# Patient Record
Sex: FEMALE | Race: WHITE | NOT HISPANIC OR LATINO
[De-identification: names, ages, dates, MRNs, and addresses within clinical notes are randomized per-mention and may not be internally consistent; named-entity substitution may affect disease eponyms.]

---

## 2017-02-23 ENCOUNTER — TRANSCRIPTION ENCOUNTER (OUTPATIENT)
Age: 67
End: 2017-02-23

## 2017-10-14 ENCOUNTER — EMERGENCY (EMERGENCY)
Facility: HOSPITAL | Age: 67
LOS: 0 days | Discharge: HOME | End: 2017-10-14
Attending: EMERGENCY MEDICINE | Admitting: EMERGENCY MEDICINE
Payer: MEDICARE

## 2017-10-14 VITALS
RESPIRATION RATE: 20 BRPM | OXYGEN SATURATION: 100 % | TEMPERATURE: 98 F | DIASTOLIC BLOOD PRESSURE: 80 MMHG | HEART RATE: 67 BPM | SYSTOLIC BLOOD PRESSURE: 170 MMHG

## 2017-10-14 VITALS — HEIGHT: 63 IN | WEIGHT: 138.01 LBS

## 2017-10-14 DIAGNOSIS — S29.8XXA OTHER SPECIFIED INJURIES OF THORAX, INITIAL ENCOUNTER: ICD-10-CM
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
## 2017-10-14 DIAGNOSIS — S22.32XA FRACTURE OF ONE RIB, LEFT SIDE, INITIAL ENCOUNTER FOR CLOSED FRACTURE: ICD-10-CM
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
## 2017-10-14 PROCEDURE — 99283 EMERGENCY DEPT VISIT LOW MDM: CPT

## 2017-10-14 PROCEDURE — 71100 X-RAY EXAM RIBS UNI 2 VIEWS: CPT | Mod: 26,LT

## 2017-10-14 PROCEDURE — 71020: CPT | Mod: 26

## 2017-10-14 NOTE — ED STATDOCS - OBJECTIVE STATEMENT
68 y/o F with no PMHx presents to the ED s/p fall at 1600 this afternoon. Pt slipped over split sour cream while at the grocery store and her left side stuck against the cart. Pain is not exacerbated with deep breaths. Pain is exacerbated with movement of left arm. Non smoker.

## 2017-10-14 NOTE — ED STATDOCS - PROGRESS NOTE DETAILS
patient refused pain meds. Patient is feeling much better, tests/labs reviewed. case discussed with attending. incentive spirometer given. OK to dc home. EDELMIRA Salguero

## 2017-10-14 NOTE — ED STATDOCS - PHYSICAL EXAMINATION
GEN: AOX3, NAD. HEENT: Throat clear. Head NC/AT. NECK: Supple, No JVD. FROM. C-spine non-tender. CHEST: +Mild tenderness left anterolateral chest wall. No obvious deformity. Equal chest expansion and rise. CV:S1S2, RRR, LUNGS: CTA/b/l, no w/r/r. ABD: Soft, NT/ND, no rebound, no guarding. No CVAT. EXT: No e/c/c. 2+ distal pulses. SKIN: No rashes. NEURO: No focal deficits. CN II-XII intact. FROM. 5/5 motor and sensory. EDELMIRA Salguero

## 2017-10-14 NOTE — ED ADULT NURSE REASSESSMENT NOTE - NS ED NURSE REASSESS COMMENT FT1
Reviewed instruction on how to properly use incentive spirometer, teach back method employed, and pt able to return demo use. All questions answered, awaiting dispo.

## 2017-10-17 ENCOUNTER — EMERGENCY (EMERGENCY)
Facility: HOSPITAL | Age: 67
LOS: 0 days | Discharge: HOME | End: 2017-10-17
Attending: EMERGENCY MEDICINE | Admitting: EMERGENCY MEDICINE
Payer: MEDICARE

## 2017-10-17 VITALS — DIASTOLIC BLOOD PRESSURE: 69 MMHG | SYSTOLIC BLOOD PRESSURE: 155 MMHG

## 2017-10-17 VITALS — HEIGHT: 63 IN | WEIGHT: 138.01 LBS
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
## 2017-10-17 DIAGNOSIS — S64.12XA INJURY OF MEDIAN NERVE AT WRIST AND HAND LEVEL OF LEFT ARM, INITIAL ENCOUNTER: ICD-10-CM

## 2017-10-17 DIAGNOSIS — M54.2 CERVICALGIA: ICD-10-CM

## 2017-10-17 PROCEDURE — 73030 X-RAY EXAM OF SHOULDER: CPT | Mod: 26,LT

## 2017-10-17 PROCEDURE — 99284 EMERGENCY DEPT VISIT MOD MDM: CPT

## 2017-10-17 PROCEDURE — 73080 X-RAY EXAM OF ELBOW: CPT | Mod: 26,LT

## 2017-10-17 RX ORDER — IBUPROFEN 200 MG
600 TABLET ORAL ONCE
Qty: 0 | Refills: 0 | Status: COMPLETED | OUTPATIENT
Start: 2017-10-17 | End: 2017-10-17

## 2017-10-17 RX ORDER — IBUPROFEN 200 MG
1 TABLET ORAL
Qty: 20 | Refills: 0 | OUTPATIENT
Start: 2017-10-17 | End: 2017-10-22

## 2017-10-17 RX ADMIN — Medication 600 MILLIGRAM(S): at 11:11

## 2017-10-17 NOTE — ED STATDOCS - ATTENDING CONTRIBUTION TO CARE
I, Saeed Alfaro, performed the initial face to face bedside interview with this patient regarding history of present illness, review of symptoms and relevant past medical, social and family history.  I completed an independent physical examination.  I was the initial provider who evaluated this patient. I have signed out the follow up of any pending tests (i.e. labs, radiological studies) to the ACP.  I have communicated the patient’s plan of care and disposition with the ACP.  The history, relevant review of systems, past medical and surgical history, medical decision making, and physical examination was documented by the scribe in my presence and I attest to the accuracy of the documentation.

## 2017-10-17 NOTE — ED ADULT NURSE NOTE - OBJECTIVE STATEMENT
66 y/o F c/o neck pain s/p a fall several days ago in grocery store. Pt found to have a rib fx and today c/o neck pain and arm soreness.

## 2017-10-17 NOTE — ED STATDOCS - MUSCULOSKELETAL, MLM
range of motion is not limited. No c-spine tenderness. tenderness lateral aspect of left elbow and wrist, FROM, 2+ pulses. No swelling/deformiting, FROM of wrist, no bony tenderness. Neurovascularly intact,.

## 2017-10-17 NOTE — ED STATDOCS - PROGRESS NOTE DETAILS
EDELMIRA Jones:   Patient has been seen, evaluated and orders have been written by the attending in intake. Patient is stable.  I will follow up the results of orders written and I will continue to evaluate/observe the patient.  Plan for xray/pain management  Eileen Jones PA-C

## 2017-10-17 NOTE — ED STATDOCS - OBJECTIVE STATEMENT
66 y/o F w/ no pmhx presents to ED c/o left neck and arm pain s/p slip and fall 3 nights ago in the grocery store. Pt went to doctor and was found to have broken rib. Now presents with left sided neck and arm soreness. Pt states pain feels like a dull ache, also c/o left shoulder, elbow and wrist pain, states it is difficult to hold her cell phone with her left hand, able to lift arm above head with some pain. Pt took ASA, has not taken an NSAIDs. Denies abd pain, LOC, or any other acute complaints. Allergic to penicillin.

## 2018-01-25 ENCOUNTER — TRANSCRIPTION ENCOUNTER (OUTPATIENT)
Age: 68
End: 2018-01-25

## 2018-01-26 ENCOUNTER — TRANSCRIPTION ENCOUNTER (OUTPATIENT)
Age: 68
End: 2018-01-26

## 2018-02-08 ENCOUNTER — TRANSCRIPTION ENCOUNTER (OUTPATIENT)
Age: 68
End: 2018-02-08

## 2018-02-20 ENCOUNTER — TRANSCRIPTION ENCOUNTER (OUTPATIENT)
Age: 68
End: 2018-02-20

## 2018-03-19 ENCOUNTER — EMERGENCY (EMERGENCY)
Facility: HOSPITAL | Age: 68
LOS: 0 days | Discharge: HOME | End: 2018-03-19
Attending: EMERGENCY MEDICINE | Admitting: EMERGENCY MEDICINE
Payer: MEDICARE

## 2018-03-19 VITALS — DIASTOLIC BLOOD PRESSURE: 78 MMHG | HEART RATE: 60 BPM | SYSTOLIC BLOOD PRESSURE: 180 MMHG

## 2018-03-19 VITALS — WEIGHT: 136.03 LBS

## 2018-03-19 DIAGNOSIS — Z91.81 HISTORY OF FALLING: ICD-10-CM

## 2018-03-19 DIAGNOSIS — M54.5 LOW BACK PAIN: ICD-10-CM

## 2018-03-19 PROCEDURE — 99283 EMERGENCY DEPT VISIT LOW MDM: CPT

## 2018-03-19 RX ORDER — DIAZEPAM 5 MG
5 TABLET ORAL ONCE
Qty: 0 | Refills: 0 | Status: DISCONTINUED | OUTPATIENT
Start: 2018-03-19 | End: 2018-03-19

## 2018-03-19 RX ORDER — KETOROLAC TROMETHAMINE 30 MG/ML
30 SYRINGE (ML) INJECTION ONCE
Qty: 0 | Refills: 0 | Status: DISCONTINUED | OUTPATIENT
Start: 2018-03-19 | End: 2018-03-19

## 2018-03-19 RX ORDER — DIAZEPAM 5 MG
1 TABLET ORAL
Qty: 21 | Refills: 0 | OUTPATIENT
Start: 2018-03-19 | End: 2018-03-25

## 2018-03-19 RX ADMIN — Medication 5 MILLIGRAM(S): at 08:32

## 2018-03-19 RX ADMIN — Medication 30 MILLIGRAM(S): at 08:32

## 2018-03-19 NOTE — ED ADULT NURSE NOTE - PAIN RATING/NUMBER SCALE (0-10): ACTIVITY
Spoke w/ Stefanie Meehan LPN regarding pt's spouse calling regarding no pain relief. Pt was given a Medrol dosepack on 09/25/17. Pt and spouse advised that it has not been enough time to work. Pt is a pt at [facility] and gets narcotics there. She is reporting BLE neuropathic pain, I Rx Gabapentin. 10

## 2018-03-19 NOTE — ED ADULT TRIAGE NOTE - CHIEF COMPLAINT QUOTE
Pt presents to ED c/o lower back pain s/p fall. Pt reports she was exercising with TRX bands when she fell backwards. Pt denies hitting head, blood thinner and LOC. Pt GCS 15 in triage. Pt denies bladder/bowel dysfunction. Pt reports she is currently on abx for treatement of diverticulitis.

## 2018-03-19 NOTE — ED PROVIDER NOTE - OBJECTIVE STATEMENT
68 yo F hx of recent diverticulitis, presents with CC back pain.  Pt exercising this morning, was using exercise bands, and they broke.  She fell onto her butt/back.  C/o low back pain.  Denies incontinence, urinary retention, saddle anesthesia, focal deficit or any other symptoms.  No meds taken prior to arrival.  No other concerns.

## 2018-03-19 NOTE — ED PROVIDER NOTE - MEDICAL DECISION MAKING DETAILS
No red flags for back pain, including no fever, no hx of IVDU, no hx of CA, no trauma, no recent surgery to spine, no urinary retention, no bowel or bladder incontinence, no saddle anesthesia, no sensory or motor neurologic deficit.  Thus no concern for epidural abscess, epidural hematoma, cauda equina, vertebral fracture, or any other emergent cause for back pain.  Diagnosis of back pain, likely musculoskeletal in nature.  Recommend rest, NSAIDs, muscle relaxants, no heavy lifting.  Follow up with PCP in 1 week.  Return precautions given.

## 2018-03-19 NOTE — ED ADULT NURSE NOTE - OBJECTIVE STATEMENT
Pt reports pulling backward on exercise band and band snapping, causing her to fall and injure back this morning.  Pt complains of severe low back pain without radiation.

## 2019-03-19 ENCOUNTER — TRANSCRIPTION ENCOUNTER (OUTPATIENT)
Age: 69
End: 2019-03-19

## 2019-08-27 ENCOUNTER — TRANSCRIPTION ENCOUNTER (OUTPATIENT)
Age: 69
End: 2019-08-27

## 2019-09-01 ENCOUNTER — TRANSCRIPTION ENCOUNTER (OUTPATIENT)
Age: 69
End: 2019-09-01

## 2020-08-19 NOTE — ED STATDOCS - FALL HARM RISK TYPE OF ASSESSMENT
Medication:   Requested Prescriptions     Pending Prescriptions Disp Refills    HYDROcodone-acetaminophen (NORCO) 7.5-325 MG per tablet 120 tablet 0     Sig: Take 1 tablet by mouth every 6 hours as needed for Pain for up to 30 days. Last Filled:  7/22/2020 #120 0rf    Patient Phone Number: [phone] (home)     Last appt: 7/17/2020   Next appt: 8/28/2020    Last OARRS:   RX Monitoring 7/22/2020   Attestation -   Acute Pain Prescriptions -   Periodic Controlled Substance Monitoring No signs of potential drug abuse or diversion identified.    Chronic Pain > 80 MEDD - Daily Assessment

## 2020-10-05 ENCOUNTER — APPOINTMENT (OUTPATIENT)
Dept: DERMATOLOGY | Facility: CLINIC | Age: 70
End: 2020-10-05
Payer: MEDICARE

## 2020-10-05 VITALS — HEIGHT: 63 IN | BODY MASS INDEX: 22.86 KG/M2 | WEIGHT: 129 LBS

## 2020-10-05 DIAGNOSIS — Z87.2 PERSONAL HISTORY OF DISEASES OF THE SKIN AND SUBCUTANEOUS TISSUE: ICD-10-CM

## 2020-10-05 DIAGNOSIS — D18.00 HEMANGIOMA UNSPECIFIED SITE: ICD-10-CM

## 2020-10-05 DIAGNOSIS — Z78.9 OTHER SPECIFIED HEALTH STATUS: ICD-10-CM

## 2020-10-05 DIAGNOSIS — L81.4 OTHER MELANIN HYPERPIGMENTATION: ICD-10-CM

## 2020-10-05 PROCEDURE — 99203 OFFICE O/P NEW LOW 30 MIN: CPT

## 2020-10-05 RX ORDER — CHROMIUM 200 MCG
TABLET ORAL
Refills: 0 | Status: ACTIVE | COMMUNITY

## 2020-10-05 NOTE — PHYSICAL EXAM
[Alert] : alert [Oriented x 3] : ~L oriented x 3 [Well Nourished] : well nourished [Full Body Skin Exam Performed] : performed [FreeTextEntry3] : A full skin exam was performed including the scalp, face, neck, chest, abdomen, back, buttocks, upper extremities and lower extremities.  The patient declined examination of the breasts and genitalia.  \par The exam did show the following benign growths:\par Lentigines.\par Red patch with red-blue papules, 2 small nodules - sternum.\par \par

## 2020-10-05 NOTE — HISTORY OF PRESENT ILLNESS
[FreeTextEntry1] : Patient presents for skin examination. [de-identified] : Notes birthmark of the chest.  Some bumps occurring intermittently within the birthmark.  No bleeding, no tenderness.  No self tx.

## 2020-10-05 NOTE — ASSESSMENT
[FreeTextEntry1] : A complete skin examination was performed.  There is no evidence of skin cancer.  We discussed the importance of photoprotection, including the use of hats, protective clothing and sunscreens with an SPF of at least 30.  Sun avoidance was also discussed.  The ABCDE's of melanoma was discussed.  Regular skin exams recommended.\par \par Hemangioma\par Education.\par Discussed PDL - if desires.\par No evidence of neoplastic changes - discussed with patient.

## 2021-01-02 ENCOUNTER — TRANSCRIPTION ENCOUNTER (OUTPATIENT)
Age: 71
End: 2021-01-02

## 2021-06-23 ENCOUNTER — TRANSCRIPTION ENCOUNTER (OUTPATIENT)
Age: 71
End: 2021-06-23

## 2021-10-22 ENCOUNTER — TRANSCRIPTION ENCOUNTER (OUTPATIENT)
Age: 71
End: 2021-10-22

## 2023-06-05 DIAGNOSIS — Z82.49 FAMILY HISTORY OF ISCHEMIC HEART DISEASE AND OTHER DISEASES OF THE CIRCULATORY SYSTEM: ICD-10-CM

## 2023-06-05 DIAGNOSIS — Z78.9 OTHER SPECIFIED HEALTH STATUS: ICD-10-CM

## 2023-06-05 DIAGNOSIS — Z83.3 FAMILY HISTORY OF DIABETES MELLITUS: ICD-10-CM

## 2023-06-05 DIAGNOSIS — Z82.3 FAMILY HISTORY OF STROKE: ICD-10-CM

## 2023-06-06 ENCOUNTER — APPOINTMENT (OUTPATIENT)
Dept: CARDIOLOGY | Facility: CLINIC | Age: 73
End: 2023-06-06
Payer: MEDICARE

## 2023-06-06 ENCOUNTER — NON-APPOINTMENT (OUTPATIENT)
Age: 73
End: 2023-06-06

## 2023-06-06 VITALS
BODY MASS INDEX: 23.04 KG/M2 | OXYGEN SATURATION: 98 % | HEART RATE: 66 BPM | WEIGHT: 130 LBS | DIASTOLIC BLOOD PRESSURE: 82 MMHG | SYSTOLIC BLOOD PRESSURE: 162 MMHG | HEIGHT: 63 IN

## 2023-06-06 DIAGNOSIS — R94.31 ABNORMAL ELECTROCARDIOGRAM [ECG] [EKG]: ICD-10-CM

## 2023-06-06 PROCEDURE — 99204 OFFICE O/P NEW MOD 45 MIN: CPT

## 2023-06-06 PROCEDURE — 93000 ELECTROCARDIOGRAM COMPLETE: CPT

## 2023-06-06 RX ORDER — AMLODIPINE BESYLATE 10 MG/1
10 TABLET ORAL DAILY
Qty: 90 | Refills: 3 | Status: ACTIVE | COMMUNITY
Start: 1900-01-01 | End: 1900-01-01

## 2023-06-06 NOTE — HISTORY OF PRESENT ILLNESS
[FreeTextEntry1] : 72 year old woman with a history of HTN presents for an initial cardiac evaluation. \par \par She is pending a cataract surgery.\par She   denies any chest pain, PND, orthopnea, lower extremity edema, near syncope, syncope, strokelike symptoms. 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

## 2023-06-06 NOTE — CARDIOLOGY SUMMARY
[de-identified] : Sinus  Rhythm \par -Left atrial enlargement. \par  -Anteroseptal infarct -age undetermined. \par

## 2023-06-06 NOTE — DISCUSSION/SUMMARY
[FreeTextEntry1] : 72 year woman with a history as listed presents for an initial cardiac evaluation. \par Melissa is doing well.  She has nonspecific EKG changes. She will undergo a treadmill exercise stress test to define exercise tolerance, rule out exertional hypertensive responses, assess for exercise induced arrhythmias and rule out ischemia from obstructive CAD. She will get a 2d echo to assess for any  new structural heart disease, changes in valvular and ventricular function. \par her blood pressure is uncontrolled. She will increase her Norvasc 10mg Qday. She will try to maintain a BP log at home. Reducing dietary salt intake advised. \par She will have her baseline lab work, including lipids, done prior to the next visit. \par Exercise and diet counseling was performed in order to reduce her future cardiovascular risk. \par She will followup with me in 3-4 months or sooner if necessary.  [EKG obtained to assist in diagnosis and management of assessed problem(s)] : EKG obtained to assist in diagnosis and management of assessed problem(s)

## 2023-06-19 LAB
ALBUMIN SERPL ELPH-MCNC: 4.8 G/DL
ALP BLD-CCNC: 77 U/L
ALT SERPL-CCNC: 24 U/L
ANION GAP SERPL CALC-SCNC: 12 MMOL/L
AST SERPL-CCNC: 26 U/L
BILIRUB SERPL-MCNC: 0.5 MG/DL
BUN SERPL-MCNC: 20 MG/DL
CALCIUM SERPL-MCNC: 10 MG/DL
CHLORIDE SERPL-SCNC: 103 MMOL/L
CHOLEST SERPL-MCNC: 233 MG/DL
CO2 SERPL-SCNC: 28 MMOL/L
CREAT SERPL-MCNC: 0.68 MG/DL
EGFR: 92 ML/MIN/1.73M2
ESTIMATED AVERAGE GLUCOSE: 114 MG/DL
GLUCOSE SERPL-MCNC: 95 MG/DL
HBA1C MFR BLD HPLC: 5.6 %
HDLC SERPL-MCNC: 77 MG/DL
LDLC SERPL CALC-MCNC: 133 MG/DL
MAGNESIUM SERPL-MCNC: 2.2 MG/DL
NONHDLC SERPL-MCNC: 156 MG/DL
POTASSIUM SERPL-SCNC: 5.1 MMOL/L
PROT SERPL-MCNC: 6.9 G/DL
SODIUM SERPL-SCNC: 143 MMOL/L
TRIGL SERPL-MCNC: 115 MG/DL
TSH SERPL-ACNC: 1.6 UIU/ML

## 2023-06-22 ENCOUNTER — APPOINTMENT (OUTPATIENT)
Dept: CARDIOLOGY | Facility: CLINIC | Age: 73
End: 2023-06-22
Payer: MEDICARE

## 2023-06-22 PROCEDURE — 93015 CV STRESS TEST SUPVJ I&R: CPT

## 2023-06-22 PROCEDURE — 93306 TTE W/DOPPLER COMPLETE: CPT

## 2023-09-07 ENCOUNTER — APPOINTMENT (OUTPATIENT)
Dept: CARDIOLOGY | Facility: CLINIC | Age: 73
End: 2023-09-07
Payer: MEDICARE

## 2023-09-07 VITALS
BODY MASS INDEX: 23.04 KG/M2 | HEART RATE: 61 BPM | OXYGEN SATURATION: 99 % | DIASTOLIC BLOOD PRESSURE: 78 MMHG | WEIGHT: 130 LBS | SYSTOLIC BLOOD PRESSURE: 127 MMHG | HEIGHT: 63 IN

## 2023-09-07 PROCEDURE — 99214 OFFICE O/P EST MOD 30 MIN: CPT

## 2023-09-07 PROCEDURE — 93000 ELECTROCARDIOGRAM COMPLETE: CPT

## 2023-09-07 NOTE — DISCUSSION/SUMMARY
[FreeTextEntry1] : 73 year woman with a history as listed presents for an initial cardiac evaluation.  Melissa is doing well.  She denies any anginal symptoms. Clinically she is euvolemic on exam.  She has nonspecific EKG changes.   her blood pressure is controlled. She will continue Norvasc 10mg Qday. She will try to maintain a BP log at home. Reducing dietary salt intake advised.   She has mildly elevated lipids. Will change her diet. Repeat in 6 months.  Exercise and diet counseling was performed in order to reduce her future cardiovascular risk.  She will followup with me in 6 months or sooner if necessary.  [EKG obtained to assist in diagnosis and management of assessed problem(s)] : EKG obtained to assist in diagnosis and management of assessed problem(s)

## 2023-09-07 NOTE — HISTORY OF PRESENT ILLNESS
[FreeTextEntry1] : 73 year old woman with a history of HTN presents for a follow-up cardiac evaluation.   Since her last visit, she is feeling well. She   denies any chest pain, PND, orthopnea, lower extremity edema, near syncope, syncope, strokelike symptoms. 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

## 2023-11-07 ENCOUNTER — APPOINTMENT (OUTPATIENT)
Dept: FAMILY MEDICINE | Facility: CLINIC | Age: 73
End: 2023-11-07
Payer: MEDICARE

## 2023-11-07 VITALS
BODY MASS INDEX: 22.32 KG/M2 | OXYGEN SATURATION: 99 % | WEIGHT: 126 LBS | DIASTOLIC BLOOD PRESSURE: 84 MMHG | SYSTOLIC BLOOD PRESSURE: 149 MMHG | HEIGHT: 63 IN | HEART RATE: 62 BPM

## 2023-11-07 VITALS — DIASTOLIC BLOOD PRESSURE: 68 MMHG | SYSTOLIC BLOOD PRESSURE: 130 MMHG

## 2023-11-07 DIAGNOSIS — Z86.79 PERSONAL HISTORY OF OTHER DISEASES OF THE CIRCULATORY SYSTEM: ICD-10-CM

## 2023-11-07 DIAGNOSIS — Z00.00 ENCOUNTER FOR GENERAL ADULT MEDICAL EXAMINATION W/OUT ABNORMAL FINDINGS: ICD-10-CM

## 2023-11-07 DIAGNOSIS — L60.3 NAIL DYSTROPHY: ICD-10-CM

## 2023-11-07 PROCEDURE — G0439: CPT

## 2023-11-07 RX ORDER — CALCIUM CARBONATE/VITAMIN D3 600 MG-10
TABLET ORAL
Refills: 0 | Status: ACTIVE | COMMUNITY

## 2023-11-14 LAB
25(OH)D3 SERPL-MCNC: 39.8 NG/ML
ALBUMIN SERPL ELPH-MCNC: 4.7 G/DL
ALP BLD-CCNC: 78 U/L
ALT SERPL-CCNC: 17 U/L
ANION GAP SERPL CALC-SCNC: 11 MMOL/L
AST SERPL-CCNC: 18 U/L
BILIRUB SERPL-MCNC: 0.3 MG/DL
BUN SERPL-MCNC: 17 MG/DL
CALCIUM SERPL-MCNC: 9.5 MG/DL
CHLORIDE SERPL-SCNC: 102 MMOL/L
CHOLEST SERPL-MCNC: 206 MG/DL
CO2 SERPL-SCNC: 26 MMOL/L
CREAT SERPL-MCNC: 0.77 MG/DL
EGFR: 82 ML/MIN/1.73M2
FERRITIN SERPL-MCNC: 67 NG/ML
FOLATE SERPL-MCNC: 13 NG/ML
GLUCOSE SERPL-MCNC: 95 MG/DL
HDLC SERPL-MCNC: 69 MG/DL
IRON SATN MFR SERPL: 20 %
IRON SERPL-MCNC: 69 UG/DL
LDLC SERPL CALC-MCNC: 123 MG/DL
NONHDLC SERPL-MCNC: 137 MG/DL
POTASSIUM SERPL-SCNC: 5.1 MMOL/L
PROT SERPL-MCNC: 6.7 G/DL
SODIUM SERPL-SCNC: 139 MMOL/L
TIBC SERPL-MCNC: 353 UG/DL
TRANSFERRIN SERPL-MCNC: 307 MG/DL
TRIGL SERPL-MCNC: 79 MG/DL
UIBC SERPL-MCNC: 284 UG/DL
VIT B12 SERPL-MCNC: 714 PG/ML

## 2024-02-29 ENCOUNTER — LABORATORY RESULT (OUTPATIENT)
Age: 74
End: 2024-02-29

## 2024-02-29 LAB
ALBUMIN SERPL ELPH-MCNC: 4.3 G/DL
ALP BLD-CCNC: 74 U/L
ALT SERPL-CCNC: 17 U/L
ANION GAP SERPL CALC-SCNC: 12 MMOL/L
AST SERPL-CCNC: 16 U/L
BASOPHILS # BLD AUTO: 0.05 K/UL
BASOPHILS NFR BLD AUTO: 0.9 %
BILIRUB SERPL-MCNC: 0.5 MG/DL
BUN SERPL-MCNC: 23 MG/DL
CALCIUM SERPL-MCNC: 9.3 MG/DL
CHLORIDE SERPL-SCNC: 103 MMOL/L
CHOLEST SERPL-MCNC: 192 MG/DL
CO2 SERPL-SCNC: 24 MMOL/L
CREAT SERPL-MCNC: 0.7 MG/DL
EGFR: 92 ML/MIN/1.73M2
EOSINOPHIL # BLD AUTO: 0.14 K/UL
EOSINOPHIL NFR BLD AUTO: 2.5 %
ESTIMATED AVERAGE GLUCOSE: 111 MG/DL
GLUCOSE SERPL-MCNC: 80 MG/DL
HBA1C MFR BLD HPLC: 5.5 %
HCT VFR BLD CALC: 39.1 %
HDLC SERPL-MCNC: 67 MG/DL
HGB BLD-MCNC: 12.9 G/DL
IMM GRANULOCYTES NFR BLD AUTO: 0.4 %
LDLC SERPL CALC-MCNC: 104 MG/DL
LYMPHOCYTES # BLD AUTO: 1.52 K/UL
LYMPHOCYTES NFR BLD AUTO: 27.6 %
MAN DIFF?: NORMAL
MCHC RBC-ENTMCNC: 28.6 PG
MCHC RBC-ENTMCNC: 33 GM/DL
MCV RBC AUTO: 86.7 FL
MONOCYTES # BLD AUTO: 0.38 K/UL
MONOCYTES NFR BLD AUTO: 6.9 %
NEUTROPHILS # BLD AUTO: 3.4 K/UL
NEUTROPHILS NFR BLD AUTO: 61.7 %
NONHDLC SERPL-MCNC: 125 MG/DL
PLATELET # BLD AUTO: 275 K/UL
POTASSIUM SERPL-SCNC: 4.2 MMOL/L
PROT SERPL-MCNC: 6.2 G/DL
RBC # BLD: 4.51 M/UL
RBC # FLD: 12.8 %
SODIUM SERPL-SCNC: 139 MMOL/L
TRIGL SERPL-MCNC: 119 MG/DL
TSH SERPL-ACNC: 0.07 UIU/ML
WBC # FLD AUTO: 5.51 K/UL

## 2024-03-07 ENCOUNTER — APPOINTMENT (OUTPATIENT)
Dept: CARDIOLOGY | Facility: CLINIC | Age: 74
End: 2024-03-07
Payer: MEDICARE

## 2024-03-07 ENCOUNTER — NON-APPOINTMENT (OUTPATIENT)
Age: 74
End: 2024-03-07

## 2024-03-07 VITALS
DIASTOLIC BLOOD PRESSURE: 77 MMHG | HEIGHT: 63 IN | SYSTOLIC BLOOD PRESSURE: 138 MMHG | HEART RATE: 82 BPM | BODY MASS INDEX: 21.97 KG/M2 | OXYGEN SATURATION: 100 % | WEIGHT: 124 LBS

## 2024-03-07 VITALS — DIASTOLIC BLOOD PRESSURE: 70 MMHG | SYSTOLIC BLOOD PRESSURE: 128 MMHG

## 2024-03-07 DIAGNOSIS — E78.5 HYPERLIPIDEMIA, UNSPECIFIED: ICD-10-CM

## 2024-03-07 DIAGNOSIS — I10 ESSENTIAL (PRIMARY) HYPERTENSION: ICD-10-CM

## 2024-03-07 PROCEDURE — 93000 ELECTROCARDIOGRAM COMPLETE: CPT

## 2024-03-07 PROCEDURE — G2211 COMPLEX E/M VISIT ADD ON: CPT

## 2024-03-07 PROCEDURE — 99214 OFFICE O/P EST MOD 30 MIN: CPT

## 2024-03-07 NOTE — CARDIOLOGY SUMMARY
[de-identified] : Sinus Rhythm  -Left atrial enlargement. -Anteroseptal infarct -age undetermined.   [de-identified] : 7/2/23 8 METs no ischemic ekg changes.  [de-identified] : 6/22/23 normal LV function.

## 2024-03-07 NOTE — DISCUSSION/SUMMARY
[FreeTextEntry1] : 73 year woman with a history as listed presents for a followup cardiac evaluation.  Melissa is doing well.  She denies any anginal symptoms. Clinically she is euvolemic on exam.  She has nonspecific EKG changes.   her blood pressure is controlled. She will continue Norvasc 10mg Qday. She will try to maintain a BP log at home. Reducing dietary salt intake advised.  her lipids have improved overall. She would like to defer statin therapy.  Exercise and diet counseling was performed in order to reduce her future cardiovascular risk.  She will followup with me in 6 months or sooner if necessary.  [EKG obtained to assist in diagnosis and management of assessed problem(s)] : EKG obtained to assist in diagnosis and management of assessed problem(s)

## 2024-03-07 NOTE — HISTORY OF PRESENT ILLNESS
[FreeTextEntry1] : 73 year old woman with a history of HTN presents for a follow-up cardiac evaluation.   Since her last visit, she is feeling well. She   denies any chest pain, PND, orthopnea, lower extremity edema, near syncope, syncope, stroke like symptoms. 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

## 2024-03-07 NOTE — PHYSICAL EXAM
[Well Developed] : well developed [No Acute Distress] : no acute distress [Well Nourished] : well nourished [Normal Venous Pressure] : normal venous pressure [Normal Conjunctiva] : normal conjunctiva [No Carotid Bruit] : no carotid bruit [Normal S1, S2] : normal S1, S2 [No Murmur] : no murmur [No Rub] : no rub [Clear Lung Fields] : clear lung fields [No Gallop] : no gallop [Good Air Entry] : good air entry [No Respiratory Distress] : no respiratory distress  [Soft] : abdomen soft [Non Tender] : non-tender [No Masses/organomegaly] : no masses/organomegaly [Normal Bowel Sounds] : normal bowel sounds [Normal Gait] : normal gait [No Edema] : no edema [No Cyanosis] : no cyanosis [No Clubbing] : no clubbing [No Varicosities] : no varicosities [No Rash] : no rash [No Skin Lesions] : no skin lesions [Moves all extremities] : moves all extremities [No Focal Deficits] : no focal deficits [Normal Speech] : normal speech [Alert and Oriented] : alert and oriented [Normal memory] : normal memory

## 2024-08-19 ENCOUNTER — RX RENEWAL (OUTPATIENT)
Age: 74
End: 2024-08-19

## 2024-09-30 ENCOUNTER — APPOINTMENT (OUTPATIENT)
Dept: CARDIOLOGY | Facility: CLINIC | Age: 74
End: 2024-09-30

## 2024-12-16 ENCOUNTER — APPOINTMENT (OUTPATIENT)
Dept: FAMILY MEDICINE | Facility: CLINIC | Age: 74
End: 2024-12-16
Payer: MEDICARE

## 2024-12-16 VITALS
OXYGEN SATURATION: 98 % | WEIGHT: 124 LBS | DIASTOLIC BLOOD PRESSURE: 80 MMHG | SYSTOLIC BLOOD PRESSURE: 150 MMHG | HEIGHT: 62 IN | TEMPERATURE: 97.8 F | HEART RATE: 67 BPM | BODY MASS INDEX: 22.82 KG/M2

## 2024-12-16 DIAGNOSIS — Z13.29 ENCOUNTER FOR SCREENING FOR OTHER SUSPECTED ENDOCRINE DISORDER: ICD-10-CM

## 2024-12-16 DIAGNOSIS — Z00.00 ENCOUNTER FOR GENERAL ADULT MEDICAL EXAMINATION W/OUT ABNORMAL FINDINGS: ICD-10-CM

## 2024-12-16 DIAGNOSIS — E78.5 HYPERLIPIDEMIA, UNSPECIFIED: ICD-10-CM

## 2024-12-16 DIAGNOSIS — I10 ESSENTIAL (PRIMARY) HYPERTENSION: ICD-10-CM

## 2024-12-16 DIAGNOSIS — J06.9 ACUTE UPPER RESPIRATORY INFECTION, UNSPECIFIED: ICD-10-CM

## 2024-12-16 DIAGNOSIS — Z13.220 ENCOUNTER FOR SCREENING FOR LIPOID DISORDERS: ICD-10-CM

## 2024-12-16 DIAGNOSIS — Z13.1 ENCOUNTER FOR SCREENING FOR DIABETES MELLITUS: ICD-10-CM

## 2024-12-16 PROCEDURE — G0439: CPT

## 2024-12-16 RX ORDER — FLUTICASONE PROPIONATE 50 UG/1
50 SPRAY, METERED NASAL
Qty: 1 | Refills: 2 | Status: ACTIVE | COMMUNITY
Start: 2024-12-16 | End: 1900-01-01

## 2025-03-05 ENCOUNTER — NON-APPOINTMENT (OUTPATIENT)
Age: 75
End: 2025-03-05

## 2025-06-06 ENCOUNTER — EMERGENCY (EMERGENCY)
Facility: HOSPITAL | Age: 75
LOS: 0 days | Discharge: HOME | End: 2025-06-06
Attending: EMERGENCY MEDICINE
Payer: MEDICARE

## 2025-06-06 VITALS
OXYGEN SATURATION: 100 % | DIASTOLIC BLOOD PRESSURE: 71 MMHG | HEART RATE: 60 BPM | RESPIRATION RATE: 14 BRPM | SYSTOLIC BLOOD PRESSURE: 152 MMHG

## 2025-06-06 VITALS — HEIGHT: 63 IN | WEIGHT: 125.22 LBS

## 2025-06-06 PROCEDURE — 73502 X-RAY EXAM HIP UNI 2-3 VIEWS: CPT | Mod: 26,LT

## 2025-06-06 PROCEDURE — 99284 EMERGENCY DEPT VISIT MOD MDM: CPT | Mod: 25

## 2025-06-06 PROCEDURE — 72131 CT LUMBAR SPINE W/O DYE: CPT

## 2025-06-06 PROCEDURE — 99053 MED SERV 10PM-8AM 24 HR FAC: CPT

## 2025-06-06 PROCEDURE — 99285 EMERGENCY DEPT VISIT HI MDM: CPT

## 2025-06-06 PROCEDURE — 72131 CT LUMBAR SPINE W/O DYE: CPT | Mod: 26

## 2025-06-06 PROCEDURE — 96374 THER/PROPH/DIAG INJ IV PUSH: CPT

## 2025-06-06 PROCEDURE — 73502 X-RAY EXAM HIP UNI 2-3 VIEWS: CPT | Mod: LT

## 2025-06-06 RX ORDER — KETOROLAC TROMETHAMINE 30 MG/ML
30 INJECTION, SOLUTION INTRAMUSCULAR; INTRAVENOUS ONCE
Refills: 0 | Status: DISCONTINUED | OUTPATIENT
Start: 2025-06-06 | End: 2025-06-06

## 2025-06-06 RX ORDER — KETOROLAC TROMETHAMINE 30 MG/ML
60 INJECTION, SOLUTION INTRAMUSCULAR; INTRAVENOUS ONCE
Refills: 0 | Status: DISCONTINUED | OUTPATIENT
Start: 2025-06-06 | End: 2025-06-06

## 2025-06-06 RX ADMIN — KETOROLAC TROMETHAMINE 30 MILLIGRAM(S): 30 INJECTION, SOLUTION INTRAMUSCULAR; INTRAVENOUS at 09:07

## 2025-06-06 NOTE — ED PROVIDER NOTE - NSFOLLOWUPINSTRUCTIONS_ED_ALL_ED_FT
Lumbar Spine Fracture  Back view of a person showing the lumbar spine and pelvis with a close-up of a fracture in the lumbar spine.  A lumbar spine fracture is a break in one of the bones of the lower back. Lumbar spine fractures can vary from mild to severe. The most severe types are those that:  Cause the broken bones to move out of place (unstable).  Injure or press on the spinal cord.  Have multiple breaks in the bones and damage to nearby tissues (complex).  During recovery, it is normal to have pain and stiffness in the lower back for several weeks.    What are the causes?  This condition may be caused by:  A fall.  A car accident.  A gunshot wound.  A hard, direct hit to the back.  What increases the risk?  You are more likely to develop this condition if:  You are in a situation that could result in a fall or other violent injury.  You have a condition that causes weakness in the bones (osteoporosis).  What are the signs or symptoms?  The main symptom of this condition is severe pain in the lower back. If a fracture is complex or severe, there may also be:  An unusual shape or swollen area on the lower back.  Limited ability to move an area of the lower back.  Inability to empty the bladder (urinary retention).  Inability to control when you urinate or have bowel movements (incontinence).  Loss of strength or sensation in the legs, feet, and toes.  Inability to move (paralysis).  How is this diagnosed?  This condition is diagnosed based on:  A physical exam.  Symptoms and what happened just before they developed.  The results of imaging tests, such as an X-ray, CT scan, or MRI.  If your nerves have been damaged, you may also have other tests to find out the extent of the damage.    How is this treated?  Treatment for this condition depends on how severe the injury is. Most fractures can be treated with:  A back brace.  Bed rest and limits on your activity.  Pain medicine.  Physical therapy.  Fractures that are complex, involve multiple bones, or make the spine unstable may require surgery. Surgery is done:  To remove pressure from the nerves or spinal cord.  To stabilize the broken pieces of bone.  Follow these instructions at home:  Medicines    Take over-the-counter and prescription medicines only as told by your health care provider.  Ask your health care provider if the medicine prescribed to you:  Requires you to avoid driving or using machinery.  Can cause constipation. You may need to take these actions to prevent or treat constipation:  Drink enough fluid to keep your urine pale yellow.  Take over-the-counter or prescription medicines.  Eat foods that are high in fiber, such as beans, whole grains, and fresh fruits and vegetables.  Limit foods that are high in fat and processed sugars, such as fried or sweet foods.  If you have a back brace:    Wear the back brace as told by your health care provider. Remove it only as told by your health care provider.  Check the skin around the brace every day. Tell your health care provider about any concerns.  Keep the brace clean.  If the brace is not waterproof:  Do not let it get wet.  Cover it with a watertight covering when you take a bath or a shower.  Ask your health care provider when it is safe to drive.  Activity    Rest as told by your health care provider.  Do exercises as told by your health care provider.  Return to your normal activities as told by your health care provider. Ask your health care provider what activities are safe for you.  Managing pain, stiffness, and swelling    Bag of ice on a towel on the skin.  If directed, put ice on the injured area. To do this:  If you have a removable brace, remove it only as told by your health care provider.  Put ice in a plastic bag.  Place a towel between your skin and the bag.  Leave the ice on for 20 minutes, 2–3 times a day.  If your skin turns bright red, remove the ice right away to prevent skin damage. The risk of skin damage is higher if you cannot feel pain, heat, or cold.  General instructions    Do not use any products that contain nicotine or tobacco. These products include cigarettes, chewing tobacco, and vaping devices, such e-cigarettes. These can delay bone healing. If you need help quitting, ask your health care provider.  Do not drink alcohol.  Keep all follow-up visits. Failing to follow up as recommended could result in permanent injury, disability, or long-lasting (chronic) pain.  Where to find more information  American Academy [employer]: orthoinfo.aaos.org  Contact a health care provider if:  You have a fever.  Your pain medicine is not helping.  Your pain does not get better over time.  You cannot return to your normal activities as planned or expected.  Get help right away if:  You have difficulty breathing.  Your pain is very bad and it suddenly gets worse.  You have numbness, tingling, or weakness in any part of your body.  You are unable to empty your bladder.  You cannot control when you urinate or have bowel movements.  You are unable to move any body part that is below the level of your injury.  You have pain in your abdomen or uncontrolled vomiting.  You have a warm, tender swelling in your leg.  These symptoms may be an emergency. Get help right away. Call 911.  Do not wait to see if the symptoms will go away.  Do not drive yourself to the hospital.  Summary  A lumbar spine fracture is a break in one of the bones of the lower back.  The main symptom of this condition is severe pain in the lower back. If a fracture is complex or severe, there may also be numbness, tingling, or paralysis in the legs.  Most fractures can be treated with a back brace, pain medicine, bed rest and limits on activity, and physical therapy.  Fractures that are complex, involve multiple bones, or make the spine unstable may require surgery.  This information is not intended to replace advice given to you by your health care provider. Make sure you discuss any questions you have with your health care provider.    Sciatica  Back view of a person showing a normal leg and a leg affected by the pain of sciatica.  Sciatica is pain, weakness, tingling, or loss of feeling (numbness) along the sciatic nerve. The sciatic nerve starts in the lower back and goes down the back of each leg. Sciatica usually affects one side of the body.    Sciatica usually goes away on its own or with treatment. Sometimes, sciatica may come back.    What are the causes?  This condition happens when the sciatic nerve is pinched or has pressure put on it. This may be caused by:  A disk in between the bones of the spine bulging out too far (herniated disk).  Changes in the spinal disks due to aging.  A condition that affects a muscle in the butt.  Extra bone growth near the sciatic nerve.  A break (fracture) of the area between your hip bones (pelvis).  Pregnancy.  Tumor. This is rare.  What increases the risk?  You are more likely to develop this condition if you:  Play sports that put pressure or stress on the spine.  Have poor strength and ease of movement (flexibility).  Have had a back injury or back surgery.  Sit for long periods of time.  Do activities that involve bending or lifting over and over again.  Are very overweight (obese).  What are the signs or symptoms?  Symptoms can vary from mild to very bad. They may include:  Any of these problems in the lower back, leg, hip, or butt:  Mild tingling, loss of feeling, or dull aches.  A burning feeling.  Sharp pains.  Loss of feeling in the back of the calf or the sole of the foot.  Leg weakness.  Very bad back pain that makes it hard to move.  These symptoms may get worse when you cough, sneeze, or laugh. They may also get worse when you sit or stand for long periods of time.    How is this treated?  This condition often gets better without any treatment. However, treatment may include:  Changing or cutting back on physical activity when you have pain.  Exercising, including strengthening and stretching.  Putting ice or heat on the affected area.  Shots of medicines to relieve pain and swelling or to relax your muscles.  Surgery.  Follow these instructions at home:  Medicines    Take over-the-counter and prescription medicines only as told by your doctor.  Ask your doctor if you should avoid driving or using machines while you are taking your medicine.  Managing pain    Bag of ice on a towel on the skin.  A heating pad for use on the affected area.  If told, put ice on the affected area. To do this:  Put ice in a plastic bag.  Place a towel between your skin and the bag.  Leave the ice on for 20 minutes, 2–3 times a day.  If your skin turns bright red, take off the ice right away to prevent skin damage. The risk of skin damage is higher if you cannot feel pain, heat, or cold.  If told, put heat on the affected area. Do this as often as told by your doctor. Use the heat source that your doctor tells you to use, such as a moist heat pack or a heating pad.  Place a towel between your skin and the heat source.  Leave the heat on for 20–30 minutes.  If your skin turns bright red, take off the heat right away to prevent burns. The risk of burns is higher if you cannot feel pain, heat, or cold.  Activity    A comparison showing the right and wrong way to lift a heavy object.  Return to your normal activities when your doctor says that it is safe.  Avoid activities that make your symptoms worse.  Take short rests during the day.  When you rest for a long time, do some physical activity or stretching between periods of rest.  Avoid sitting for a long time without moving. Get up and move around at least one time each hour.  Do exercises and stretches as told by your doctor.  Do not lift anything that is heavier than 10 lb (4.5 kg).  Avoid lifting heavy things even when you do not have symptoms.  Avoid lifting heavy things over and over.  When you lift objects, always lift in a way that is safe for your body. To do this, you should:  Bend your knees.  Keep the object close to your body.  Avoid twisting.  General instructions    Stay at a healthy weight.  Wear comfortable shoes that support your feet. Avoid wearing high heels.  Avoid sleeping on a mattress that is too soft or too hard. You might have less pain if you sleep on a mattress that is firm enough to support your back.  Contact a doctor if:  Your pain is not controlled by medicine.  Your pain does not get better.  Your pain gets worse.  Your pain lasts longer than 4 weeks.  You lose weight without trying.  Get help right away if:  You cannot control when you pee (urinate) or poop (have a bowel movement).  You have weakness in any of these areas and it gets worse:  Lower back.  The area between your hip bones.  Butt.  Legs.  You have redness or swelling of your back.  You have a burning feeling when you pee.  Summary  Sciatica is pain, weakness, tingling, or loss of feeling (numbness) along the sciatic nerve. This may include the lower back, legs, hips, and butt.  This condition happens when the sciatic nerve is pinched or has pressure put on it.  Treatment often includes rest, exercise, medicines, and putting ice or heat on the affected area.  This information is not intended to replace advice given to you by your health care provider. Make sure you discuss any questions you have with your health care provider.    Document Revised: 03/27/2023 Document Reviewed: 03/27/2023  [employer] Patient Education © 2025 [employer]  [employer] logo  Terms and Conditions  Privacy Policy  Editorial Policy  All content on this site: Copyright © 2025 [employer], its licensors, and contributors. All rights are reserved, including those for text and data mining, [employer] training, and similar technologies. For all open access content, the Creative Commons licensing terms apply.  Cookies are used by this site. To decline or learn more, visit our Cookies page.    Document Revised: 04/14/2023 Document Reviewed: 04/14/2023  Elsevier Patient Education © 2025 [employer]  [employer] logo  Terms and Conditions  Privacy Policy  Editorial Policy  All content on this site: Copyright © 2025 Elsevier, its licensors, and contributors. All rights are reserved, including those for text and data mining, AI training, and similar technologies. For all open access content, the Creative Commons licensing terms apply.  Cookies are used by this site. To decli

## 2025-06-06 NOTE — ED PROVIDER NOTE - CARE PLAN
Principal Discharge DX:	Closed wedge fracture of lumbar vertebra, unspecified lumbar vertebral level, initial encounter  Secondary Diagnosis:	Acute sciatica   1

## 2025-06-06 NOTE — ED ADULT NURSE NOTE - OBJECTIVE STATEMENT
Patient ambulatory to the ER for c/o left sided thigh pain starting on Tuesday. Pt has hx of HTN. Denies any CP or dizziness. Patient endorses the pain is when she's sleeping or when she's awake and walking. Denies any pain medication taken today. Patient is awaiting MD KRAUSE to evaluate. Side rails up.

## 2025-06-06 NOTE — ED PROVIDER NOTE - PATIENT PORTAL LINK FT
You can access the FollowMyHealth Patient Portal offered by [facility] by registering at the following website: [email or web address]. By joining [email or web address]’s FollowMyHealth portal, you will also be able to view your health information using other applications (apps) compatible with our system.

## 2025-06-06 NOTE — ED PROVIDER NOTE - CARE PROVIDER_API CALL
Charles Garcia  Orthopaedic Surgery  [address] [place] [id number]  Phone: [phone]  Fax: [phone]  Follow Up Time:

## 2025-06-06 NOTE — ED ADULT TRIAGE NOTE - GLASGOW COMA SCALE: SCORE, MLM
I called and spoke to pt  He is aware and will just wait to see what Ortho has to say  MRI was canceled by me today  RE: PEER TO PEER   Received: 1 week ago   Message Contents   MEGHNA Chew             I am certain this is because he has not yet had physical therapy or orthopedic evaluation   Would recommend setting up both 1st    Rajat    Previous Messages      ----- Message -----   From: Evin Faustin   Sent: 3/18/2019  12:36 PM   To: Rosaura Miranda PA-C   Subject: FW: PEER TO PEER                                 Rajat, Would you be willing to do this? Please let us know and we can make the call to the insurance for you and wait on hold til they are ready for you        ----- Message -----   From: Adriaen Cheng   Sent: 3/18/2019  12:17 PM   To: Rosaura Miranda PA-C, [facility]   Subject: PEER TO PEER                                     AUTHORIZATION FOR CPT CODE 58165 MRI LUMBAR SPINE WO CONTRAST IS CURRENTLY IN A PEER TO PEER STATUS DUE TO NOT [address]  CLINICALS JENI REVIEWED INCLUDE:   OFFICE NOTES FROM: 03/13/2019, 02/19/2019, 02/13/2019   X-RAY RESULTS FROM 03/13/2019  PHONE # FOR PEER TO PEER [phone] OPTION # 3  REFERENCE TRACKING # [id number]  PATIENT IS CURRENTLY SCHEDULED FOR 03/30/2019 @ 8:45 AM AT [address]  PLEASE LET ME KNOW IF A PEER TO PEER IS COMPLETED SO I CAN UPDATE MY RECORDS       Marito Pickett [address] [place] [address] 15

## 2025-06-06 NOTE — ED ADULT NURSE NOTE - NSFALLHARMRISKINTERV_ED_ALL_ED
Assistance OOB with selected safe patient handling equipment if applicable/Assistance with ambulation/Communicate risk of Fall with Harm to all staff, patient, and family/Monitor gait and stability/Provide visual cue: red socks, yellow wristband, yellow gown, etc/Reinforce activity limits and safety measures with patient and family/Bed in lowest position, wheels locked, appropriate side rails in place/Call bell, personal items and telephone in reach/Instruct patient to call for assistance before getting out of bed/chair/stretcher/Non-slip footwear applied when patient is off stretcher/[place] to call system/Physically safe environment - no spills, clutter or unnecessary equipment/Purposeful Proactive Rounding/Room/bathroom lighting operational, light cord in reach

## 2025-06-06 NOTE — ED PROVIDER NOTE - PROGRESS NOTE DETAILS
pt has pain relief from toradol, in retrospect she has been leaning over to garden. referred to Dr Garcia as per  request will jace Hernandez DO

## 2025-06-06 NOTE — ED PROVIDER NOTE - CLINICAL SUMMARY MEDICAL DECISION MAKING FREE TEXT BOX
pt with left buttocks pain with radiation to knee, no ho trauma, will get ct lumbar spine r/o disc herniation and hip xray r/o fx

## 2025-06-06 NOTE — ED ADULT TRIAGE NOTE - CHIEF COMPLAINT QUOTE
pt ambulatory to the er c/p left thigh pain beginning tuesday. endorsing intermittent groin pain, and radiation down leg. denies numbness, tingling, weakness. took motrin last night with mild relief.

## 2025-06-25 ENCOUNTER — APPOINTMENT (OUTPATIENT)
Dept: OBGYN | Facility: CLINIC | Age: 75
End: 2025-06-25
Payer: MEDICARE

## 2025-06-25 VITALS
BODY MASS INDEX: 22.15 KG/M2 | SYSTOLIC BLOOD PRESSURE: 140 MMHG | DIASTOLIC BLOOD PRESSURE: 70 MMHG | WEIGHT: 125 LBS | HEIGHT: 63 IN

## 2025-06-25 PROBLEM — Z13.820 OSTEOPOROSIS SCREENING: Status: ACTIVE | Noted: 2025-06-20

## 2025-06-25 PROBLEM — Z12.39 BREAST SCREENING: Status: ACTIVE | Noted: 2025-06-20

## 2025-06-25 PROBLEM — Z01.419 ENCOUNTER FOR ROUTINE GYNECOLOGICAL EXAMINATION: Status: ACTIVE | Noted: 2025-06-20

## 2025-06-25 PROBLEM — Z12.4 CERVICAL CANCER SCREENING: Status: ACTIVE | Noted: 2025-06-25

## 2025-06-25 PROCEDURE — G0101: CPT

## 2025-07-01 ENCOUNTER — APPOINTMENT (OUTPATIENT)
Dept: MAMMOGRAPHY | Facility: CLINIC | Age: 75
End: 2025-07-01
Payer: MEDICARE

## 2025-07-01 ENCOUNTER — RESULT REVIEW (OUTPATIENT)
Age: 75
End: 2025-07-01

## 2025-07-01 ENCOUNTER — OUTPATIENT (OUTPATIENT)
Dept: OUTPATIENT SERVICES | Facility: HOSPITAL | Age: 75
LOS: 1 days | End: 2025-07-01
Payer: MEDICARE

## 2025-07-01 DIAGNOSIS — Z12.39 ENCOUNTER FOR OTHER SCREENING FOR MALIGNANT NEOPLASM OF BREAST: ICD-10-CM

## 2025-07-01 LAB — CYTOLOGY CVX/VAG DOC THIN PREP: ABNORMAL

## 2025-07-01 PROCEDURE — 77067 SCR MAMMO BI INCL CAD: CPT | Mod: 26

## 2025-07-01 PROCEDURE — 77067 SCR MAMMO BI INCL CAD: CPT

## 2025-07-01 PROCEDURE — 77063 BREAST TOMOSYNTHESIS BI: CPT | Mod: 26

## 2025-07-01 PROCEDURE — 77063 BREAST TOMOSYNTHESIS BI: CPT

## 2025-07-25 ENCOUNTER — EMERGENCY (EMERGENCY)
Facility: HOSPITAL | Age: 75
LOS: 0 days | Discharge: HOME | End: 2025-07-25
Attending: STUDENT IN AN ORGANIZED HEALTH CARE EDUCATION/TRAINING PROGRAM
Payer: MEDICARE

## 2025-07-25 VITALS
DIASTOLIC BLOOD PRESSURE: 68 MMHG | SYSTOLIC BLOOD PRESSURE: 149 MMHG | OXYGEN SATURATION: 99 % | HEART RATE: 57 BPM | RESPIRATION RATE: 18 BRPM | TEMPERATURE: 98 F

## 2025-07-25 VITALS — HEIGHT: 63 IN

## 2025-07-25 DIAGNOSIS — I10 ESSENTIAL (PRIMARY) HYPERTENSION: ICD-10-CM

## 2025-07-25 DIAGNOSIS — Z90.49 ACQUIRED ABSENCE OF OTHER SPECIFIED PARTS OF DIGESTIVE TRACT: ICD-10-CM

## 2025-07-25 DIAGNOSIS — R10.30 LOWER ABDOMINAL PAIN, UNSPECIFIED: ICD-10-CM

## 2025-07-25 DIAGNOSIS — Z88.0 ALLERGY STATUS TO PENICILLIN: ICD-10-CM

## 2025-07-25 DIAGNOSIS — R19.7 DIARRHEA, UNSPECIFIED: ICD-10-CM

## 2025-07-25 DIAGNOSIS — K57.92 DIVERTICULITIS OF INTESTINE, PART UNSPECIFIED, WITHOUT PERFORATION OR ABSCESS WITHOUT BLEEDING: ICD-10-CM

## 2025-07-25 LAB
ALBUMIN SERPL ELPH-MCNC: 3.9 G/DL — SIGNIFICANT CHANGE UP (ref 3.3–5)
ALP SERPL-CCNC: 90 U/L — SIGNIFICANT CHANGE UP (ref 40–120)
ALT FLD-CCNC: 26 U/L — SIGNIFICANT CHANGE UP (ref 12–78)
ANION GAP SERPL CALC-SCNC: 4 MMOL/L — LOW (ref 5–17)
APPEARANCE UR: CLEAR — SIGNIFICANT CHANGE UP
AST SERPL-CCNC: 22 U/L — SIGNIFICANT CHANGE UP (ref 15–37)
BASOPHILS # BLD AUTO: 0.05 K/UL — SIGNIFICANT CHANGE UP (ref 0–0.2)
BASOPHILS NFR BLD AUTO: 0.6 % — SIGNIFICANT CHANGE UP (ref 0–2)
BILIRUB SERPL-MCNC: 0.2 MG/DL — SIGNIFICANT CHANGE UP (ref 0.2–1.2)
BILIRUB UR-MCNC: NEGATIVE — SIGNIFICANT CHANGE UP
BUN SERPL-MCNC: 13 MG/DL — SIGNIFICANT CHANGE UP (ref 7–23)
CALCIUM SERPL-MCNC: 9.3 MG/DL — SIGNIFICANT CHANGE UP (ref 8.5–10.1)
CHLORIDE SERPL-SCNC: 104 MMOL/L — SIGNIFICANT CHANGE UP (ref 96–108)
CO2 SERPL-SCNC: 29 MMOL/L — SIGNIFICANT CHANGE UP (ref 22–31)
COLOR SPEC: YELLOW — SIGNIFICANT CHANGE UP
CREAT SERPL-MCNC: 0.78 MG/DL — SIGNIFICANT CHANGE UP (ref 0.5–1.3)
DIFF PNL FLD: NEGATIVE — SIGNIFICANT CHANGE UP
EGFR: 80 ML/MIN/1.73M2 — SIGNIFICANT CHANGE UP
EGFR: 80 ML/MIN/1.73M2 — SIGNIFICANT CHANGE UP
EOSINOPHIL # BLD AUTO: 0.17 K/UL — SIGNIFICANT CHANGE UP (ref 0–0.5)
EOSINOPHIL NFR BLD AUTO: 2 % — SIGNIFICANT CHANGE UP (ref 0–6)
GLUCOSE SERPL-MCNC: 101 MG/DL — HIGH (ref 70–99)
GLUCOSE UR QL: NEGATIVE MG/DL — SIGNIFICANT CHANGE UP
HCT VFR BLD CALC: 41.8 % — SIGNIFICANT CHANGE UP (ref 34.5–45)
HGB BLD-MCNC: 13.6 G/DL — SIGNIFICANT CHANGE UP (ref 11.5–15.5)
IMM GRANULOCYTES # BLD AUTO: 0.02 K/UL — SIGNIFICANT CHANGE UP (ref 0–0.07)
IMM GRANULOCYTES NFR BLD AUTO: 0.2 % — SIGNIFICANT CHANGE UP (ref 0–0.9)
KETONES UR QL: NEGATIVE MG/DL — SIGNIFICANT CHANGE UP
LEUKOCYTE ESTERASE UR-ACNC: NEGATIVE — SIGNIFICANT CHANGE UP
LIDOCAIN IGE QN: 22 U/L — SIGNIFICANT CHANGE UP (ref 13–75)
LYMPHOCYTES # BLD AUTO: 1.91 K/UL — SIGNIFICANT CHANGE UP (ref 1–3.3)
LYMPHOCYTES NFR BLD AUTO: 22.8 % — SIGNIFICANT CHANGE UP (ref 13–44)
MCHC RBC-ENTMCNC: 28.6 PG — SIGNIFICANT CHANGE UP (ref 27–34)
MCHC RBC-ENTMCNC: 32.5 G/DL — SIGNIFICANT CHANGE UP (ref 32–36)
MCV RBC AUTO: 88 FL — SIGNIFICANT CHANGE UP (ref 80–100)
MONOCYTES # BLD AUTO: 0.45 K/UL — SIGNIFICANT CHANGE UP (ref 0–0.9)
MONOCYTES NFR BLD AUTO: 5.4 % — SIGNIFICANT CHANGE UP (ref 2–14)
NEUTROPHILS # BLD AUTO: 5.78 K/UL — SIGNIFICANT CHANGE UP (ref 1.8–7.4)
NEUTROPHILS NFR BLD AUTO: 69 % — SIGNIFICANT CHANGE UP (ref 43–77)
NITRITE UR-MCNC: NEGATIVE — SIGNIFICANT CHANGE UP
NRBC # BLD AUTO: 0 K/UL — SIGNIFICANT CHANGE UP (ref 0–0)
NRBC # FLD: 0 K/UL — SIGNIFICANT CHANGE UP (ref 0–0)
NRBC BLD AUTO-RTO: 0 /100 WBCS — SIGNIFICANT CHANGE UP (ref 0–0)
PH UR: 5.5 — SIGNIFICANT CHANGE UP (ref 5–8)
PLATELET # BLD AUTO: 278 K/UL — SIGNIFICANT CHANGE UP (ref 150–400)
PMV BLD: 10.3 FL — SIGNIFICANT CHANGE UP (ref 7–13)
POTASSIUM SERPL-MCNC: 3.7 MMOL/L — SIGNIFICANT CHANGE UP (ref 3.5–5.3)
POTASSIUM SERPL-SCNC: 3.7 MMOL/L — SIGNIFICANT CHANGE UP (ref 3.5–5.3)
PROT SERPL-MCNC: 7.5 GM/DL — SIGNIFICANT CHANGE UP (ref 6–8.3)
PROT UR-MCNC: NEGATIVE MG/DL — SIGNIFICANT CHANGE UP
RBC # BLD: 4.75 M/UL — SIGNIFICANT CHANGE UP (ref 3.8–5.2)
RBC # FLD: 12.8 % — SIGNIFICANT CHANGE UP (ref 10.3–14.5)
SODIUM SERPL-SCNC: 137 MMOL/L — SIGNIFICANT CHANGE UP (ref 135–145)
SP GR SPEC: 1.01 — SIGNIFICANT CHANGE UP (ref 1–1.03)
UROBILINOGEN FLD QL: 0.2 MG/DL — SIGNIFICANT CHANGE UP (ref 0.2–1)
WBC # BLD: 8.38 K/UL — SIGNIFICANT CHANGE UP (ref 3.8–10.5)
WBC # FLD AUTO: 8.38 K/UL — SIGNIFICANT CHANGE UP (ref 3.8–10.5)

## 2025-07-25 PROCEDURE — 36000 PLACE NEEDLE IN VEIN: CPT | Mod: XU

## 2025-07-25 PROCEDURE — 81003 URINALYSIS AUTO W/O SCOPE: CPT

## 2025-07-25 PROCEDURE — 74177 CT ABD & PELVIS W/CONTRAST: CPT

## 2025-07-25 PROCEDURE — 80053 COMPREHEN METABOLIC PANEL: CPT

## 2025-07-25 PROCEDURE — 99285 EMERGENCY DEPT VISIT HI MDM: CPT

## 2025-07-25 PROCEDURE — 85025 COMPLETE CBC W/AUTO DIFF WBC: CPT

## 2025-07-25 PROCEDURE — 99284 EMERGENCY DEPT VISIT MOD MDM: CPT | Mod: 25

## 2025-07-25 PROCEDURE — 36415 COLL VENOUS BLD VENIPUNCTURE: CPT

## 2025-07-25 PROCEDURE — 83690 ASSAY OF LIPASE: CPT

## 2025-07-25 PROCEDURE — 74177 CT ABD & PELVIS W/CONTRAST: CPT | Mod: 26

## 2025-07-25 RX ORDER — CIPROFLOXACIN HCL 250 MG
1 TABLET ORAL
Qty: 20 | Refills: 0
Start: 2025-07-25 | End: 2025-08-03

## 2025-07-25 RX ORDER — METRONIDAZOLE 250 MG
500 TABLET ORAL ONCE
Refills: 0 | Status: COMPLETED | OUTPATIENT
Start: 2025-07-25 | End: 2025-07-25

## 2025-07-25 RX ORDER — METRONIDAZOLE 250 MG
1 TABLET ORAL
Qty: 30 | Refills: 0
Start: 2025-07-25 | End: 2025-08-03

## 2025-07-25 RX ORDER — CIPROFLOXACIN HCL 250 MG
500 TABLET ORAL ONCE
Refills: 0 | Status: COMPLETED | OUTPATIENT
Start: 2025-07-25 | End: 2025-07-25

## 2025-07-25 RX ADMIN — Medication 1000 MILLILITER(S): at 16:57

## 2025-07-25 RX ADMIN — Medication 500 MILLIGRAM(S): at 20:53

## 2025-07-25 NOTE — ED STATDOCS - PHYSICAL EXAMINATION
Constitutional: well appearing, NAD AAOx3  Eyes: EOMI, PERRL  Head: Normocephalic atraumatic  Mouth: no airway obstruction, posterior oropharynx clear without erythema or exudate  Neck: supple  Cardiac: regular rate and rhythm, no MRG  Resp: Lungs CTAB  GI: Abd s/nd, LLQ abdominal tenderness to palpation, + rebound, no CVAT  Neuro: CN2-12 intact, strength 5/5x4, sensation grossly intact  Skin: No rashes

## 2025-07-25 NOTE — ED STATDOCS - DR. NAME
Vasiliy Sarecycline Pregnancy And Lactation Text: This medication is Pregnancy Category D and not consider safe during pregnancy. It is also excreted in breast milk.

## 2025-07-25 NOTE — ED STATDOCS - NSFOLLOWUPINSTRUCTIONS_ED_ALL_ED_FT
Please use 650mg tylenol (or acetaminophen) every 6 hours and 600mg motrin (or advil or ibuprofen) every 6 hours as needed for pain/discomfort/swelling.  Make sure not to use more than 3500mg in any 24 hour period.     Diverticulitis Diet    WHAT YOU NEED TO KNOW:    A diverticulitis diet includes foods that allow your intestines to rest while you have diverticulitis. Diverticulitis is a condition that causes small pockets along your intestine called diverticula to become inflamed or infected. This is caused by hard bowel movement, food, or bacteria that get stuck in the pockets.  Diverticula    DISCHARGE INSTRUCTIONS:    Foods that may be recommended while you have diverticulitis:    A clear liquid diet may be recommended for 2 to 3 days. A clear liquid diet includes clear liquids, and foods that are liquid at room temperature. Examples include the following:  Water and clear juices (such as apple, cranberry, or grape), strained citrus juices or fruit punch    Coffee or tea (without cream or milk)    Clear sports drinks or soft drinks, such as ginger ale, lemon-lime soda, or club soda (no cola or root beer)    Clear broth, bouillon, or consommé    Plain popsicles (no popsicles with pureed fruit or fiber)    Flavored gelatin without fruit    Low-fiber foods may be recommended until your symptoms improve. Examples include the following:  Cream of wheat and finely ground grits    White bread, white pasta, and white rice    Canned and well-cooked fruit without skins or seeds, and juice without pulp    Canned and well-cooked vegetables without skins or seeds, and vegetable juice    Cow's milk, lactose-free milk, soy milk, and rice milk    Yogurt, cottage cheese, and sherbet    Eggs, poultry (such as chicken and turkey), fish, and tender, ground, well-cooked beef    Tofu and smooth nut butters, such as peanut butter    Broth and strained soups made of low-fiber foods  High-fiber foods can help prevent diverticulosis and diverticulitis. Your healthcare provider will tell you when you can add high-fiber foods back into your diet. Examples include the following:    Whole grains and breads, and cereals made with whole grains    Dried fruit, fresh fruit with skin, and fruit pulp    Raw vegetables    Cooked greens, such as spinach    Tough meat and meat with gristle    Legumes, such as chauhan beans and lentils    Call your doctor or dietitian if:    Your symptoms do not get better, or they get worse.    You have questions about the foods you should eat.    You have questions or concerns about your condition or care.

## 2025-07-25 NOTE — ED STATDOCS - PATIENT PORTAL LINK FT
You can access the FollowMyHealth Patient Portal offered by [facility] by registering at the following website: [email or web address]. By joining [employer]’s FollowMyHealth portal, you will also be able to view your health information using other applications (apps) compatible with our system.

## 2025-07-25 NOTE — ED STATDOCS - OBJECTIVE STATEMENT
76 y/o female with a PMHx of HTN, cholecystectomy and h/o diverticulitis presents to the ED c/o lower abdominal pain onset last night .Pt was SIB PCP for CT scan. Pt states the pain similar to her last episode of diverticulitis. Pt denies nausea, vomiting, fever, hematuria, dysuria, SOB, chest pain, hematochezia, tarry stools. Pt endorses diarrhea. Pt reports taking 3 doses of flagellin Cipro. Note pt allergy to penicillin (hives).

## 2025-07-25 NOTE — ED ADULT TRIAGE NOTE - CHIEF COMPLAINT QUOTE
Pt c/o lower abdominal pain starting last night. HX of diverticulitis- states pain is similar to when she was last diagnosed. Denies N/V and fever.

## 2025-07-25 NOTE — ED STATDOCS - PROGRESS NOTE DETAILS
EDELMIRA Galdamez: 74 y/o F with pmhx of HTN, diverticulitis, past surgical hx of cholecystectomy who presents to the ED c/o LLQ pain that started last night. Pt reports that pain feels similar to prior episodes of diverticulitis. pt was given abx by a family friend and took 3 doses of flagyl and cipro. Denies n/v, dysuria/hematuria, CP, SOB, f/c, melena/hematochezia. pt does follow with a GI doctor however has not seen in some time.   Physical: tenderness to palpation over LLQ and suprapubic region.  Plan: labs, urine studies, CT [place]: +diverticulitis on ct scan. allergic to pcn. will give cipro/flagyl and diveriticulitis diet. Discussed with patient need to return to ED if symptoms don't continue to improve or recur or develops any new or worsening symptoms that are of concern.

## 2025-07-25 NOTE — ED STATDOCS - CLINICAL SUMMARY MEDICAL DECISION MAKING FREE TEXT BOX
Presentation most concerning for diverticulitis, colitis and pyelonephritis. Plan for labs, UA, CT, monitor and reassessment. Presentation most concerning for diverticulitis, colitis and pyelonephritis. Plan for labs, UA, CT, monitor and reassessment.  Labs grossly unremarkable, UA negative for UTI.  CT shows acute diverticulitis.  Patient is afebrile and well-appearing, pain controlled.  DC home in stable condition with p.o. antibiotics, follow-up instructions and strict return precautions

## 2025-07-25 NOTE — ED ADULT NURSE NOTE - NSFALLUNIVINTERV_ED_ALL_ED
Bed/Stretcher in lowest position, wheels locked, appropriate side rails in place/Call bell, personal items and telephone in reach/Instruct patient to call for assistance before getting out of bed/chair/stretcher/Non-slip footwear applied when patient is off stretcher/[place] to call system/Physically safe environment - no spills, clutter or unnecessary equipment/Purposeful proactive rounding/Room/bathroom lighting operational, light cord in reach